# Patient Record
Sex: FEMALE | Race: WHITE
[De-identification: names, ages, dates, MRNs, and addresses within clinical notes are randomized per-mention and may not be internally consistent; named-entity substitution may affect disease eponyms.]

---

## 2020-08-06 ENCOUNTER — HOSPITAL ENCOUNTER (EMERGENCY)
Dept: HOSPITAL 26 - MED | Age: 30
LOS: 1 days | Discharge: HOME | End: 2020-08-07
Payer: COMMERCIAL

## 2020-08-06 VITALS — BODY MASS INDEX: 22.5 KG/M2 | WEIGHT: 140 LBS | HEIGHT: 66 IN

## 2020-08-06 VITALS — SYSTOLIC BLOOD PRESSURE: 109 MMHG | DIASTOLIC BLOOD PRESSURE: 67 MMHG

## 2020-08-06 DIAGNOSIS — Z88.0: ICD-10-CM

## 2020-08-06 DIAGNOSIS — I10: ICD-10-CM

## 2020-08-06 DIAGNOSIS — R41.82: Primary | ICD-10-CM

## 2020-08-06 DIAGNOSIS — F31.89: ICD-10-CM

## 2020-08-06 DIAGNOSIS — F17.210: ICD-10-CM

## 2020-08-06 LAB
ALBUMIN FLD-MCNC: 3.9 G/DL (ref 3.4–5)
ANION GAP SERPL CALCULATED.3IONS-SCNC: 10.7 MMOL/L (ref 8–16)
APAP SERPL-MCNC: < 0.5 UG/ML (ref 10–30)
APPEARANCE UR: CLEAR
AST SERPL-CCNC: 16 U/L (ref 15–37)
BARBITURATES UR QL SCN: NEGATIVE NG/ML
BASOPHILS # BLD AUTO: 0 K/UL (ref 0–0.22)
BASOPHILS NFR BLD AUTO: 0.6 % (ref 0–2)
BENZODIAZ UR QL SCN: NEGATIVE NG/ML
BILIRUB SERPL-MCNC: 0.4 MG/DL (ref 0–1)
BILIRUB UR QL STRIP: (no result)
BUN SERPL-MCNC: 16 MG/DL (ref 7–18)
BZE UR QL SCN: NEGATIVE NG/ML
CANNABINOIDS UR QL SCN: POSITIVE NG/ML
CHLORIDE SERPL-SCNC: 100 MMOL/L (ref 98–107)
CO2 SERPL-SCNC: 30.9 MMOL/L (ref 21–32)
COLOR UR: YELLOW
CREAT SERPL-MCNC: 0.9 MG/DL (ref 0.6–1.3)
EOSINOPHIL # BLD AUTO: 0.2 K/UL (ref 0–0.4)
EOSINOPHIL NFR BLD AUTO: 1.9 % (ref 0–4)
ERYTHROCYTE [DISTWIDTH] IN BLOOD BY AUTOMATED COUNT: 15.2 % (ref 11.6–13.7)
GFR SERPL CREATININE-BSD FRML MDRD: 93 ML/MIN (ref 90–?)
GLUCOSE SERPL-MCNC: 77 MG/DL (ref 74–106)
GLUCOSE UR STRIP-MCNC: NEGATIVE MG/DL
HCT VFR BLD AUTO: 38.4 % (ref 36–48)
HGB BLD-MCNC: 12.7 G/DL (ref 12–16)
HGB UR QL STRIP: (no result)
LEUKOCYTE ESTERASE UR QL STRIP: NEGATIVE
LYMPHOCYTES # BLD AUTO: 2.2 K/UL (ref 2.5–16.5)
LYMPHOCYTES NFR BLD AUTO: 26.5 % (ref 20.5–51.1)
MCH RBC QN AUTO: 29 PG (ref 27–31)
MCHC RBC AUTO-ENTMCNC: 33 G/DL (ref 33–37)
MCV RBC AUTO: 87.7 FL (ref 80–94)
MONOCYTES # BLD AUTO: 0.9 K/UL (ref 0.8–1)
MONOCYTES NFR BLD AUTO: 11.1 % (ref 1.7–9.3)
NEUTROPHILS # BLD AUTO: 4.9 K/UL (ref 1.8–7.7)
NEUTROPHILS NFR BLD AUTO: 59.9 % (ref 42.2–75.2)
NITRITE UR QL STRIP: NEGATIVE
OPIATES UR QL SCN: NEGATIVE NG/ML
PCP UR QL SCN: NEGATIVE NG/ML
PH UR STRIP: 6 [PH] (ref 5–9)
PLATELET # BLD AUTO: 270 K/UL (ref 140–450)
POTASSIUM SERPL-SCNC: 2.6 MMOL/L (ref 3.5–5.1)
RBC # BLD AUTO: 4.38 MIL/UL (ref 4.2–5.4)
RBC #/AREA URNS HPF: (no result) /HPF (ref 0–5)
SALICYLATES SERPL-MCNC: < 2.8 MG/DL (ref 2.8–20)
SODIUM SERPL-SCNC: 139 MMOL/L (ref 136–145)
WBC # BLD AUTO: 8.1 K/UL (ref 4.8–10.8)
WBC,URINE: (no result) /HPF (ref 0–5)

## 2020-08-06 PROCEDURE — 80305 DRUG TEST PRSMV DIR OPT OBS: CPT

## 2020-08-06 PROCEDURE — G0480 DRUG TEST DEF 1-7 CLASSES: HCPCS

## 2020-08-06 PROCEDURE — 81001 URINALYSIS AUTO W/SCOPE: CPT

## 2020-08-06 PROCEDURE — 80053 COMPREHEN METABOLIC PANEL: CPT

## 2020-08-06 PROCEDURE — 93005 ELECTROCARDIOGRAM TRACING: CPT

## 2020-08-06 PROCEDURE — 85025 COMPLETE CBC W/AUTO DIFF WBC: CPT

## 2020-08-06 PROCEDURE — 84484 ASSAY OF TROPONIN QUANT: CPT

## 2020-08-06 PROCEDURE — 99284 EMERGENCY DEPT VISIT MOD MDM: CPT

## 2020-08-06 PROCEDURE — 80178 ASSAY OF LITHIUM: CPT

## 2020-08-06 PROCEDURE — 81025 URINE PREGNANCY TEST: CPT

## 2020-08-06 PROCEDURE — G0482 DRUG TEST DEF 15-21 CLASSES: HCPCS

## 2020-08-06 PROCEDURE — 36415 COLL VENOUS BLD VENIPUNCTURE: CPT

## 2020-08-06 RX ADMIN — SODIUM CHLORIDE ONE MLS/HR: 9 INJECTION, SOLUTION INTRAVENOUS at 16:47

## 2020-08-06 RX ADMIN — POTASSIUM CHLORIDE ONE MEQ: 750 TABLET, FILM COATED, EXTENDED RELEASE ORAL at 18:19

## 2020-08-06 NOTE — NUR
SPOKE WITH JESS (PHARMACIST) FROM [facility]ISON [facility] @ [phone] AND GAVE 
UPDATE REGARDING RECENT LABS. JESS ADVISED TO OBTAIN LITHIUM LEVEL, SO WILL 
INFORM RON KENNEY.

## 2020-08-06 NOTE — NUR
CALLED POISON CONTROL AT [phone] SPOKE TO CARLITA.  PER CARLITA THERE ARE NO 
CLEAR OBSERVATION HOURS FOR PT.  SHE RECOMMENDS A LITHIUM, AND CHEM DRAW EVERY 
4 HRS, UNTIL CLEAR PEAK AND DOWNTREND X 2. ALSO  NS @ 150-200 ML/HR, SHE STATES 
THIS HELPS TO EXCRETE LITHIUM FROM THE KIDNEYS.  RON MADE AWARE OF POISON 
CONTROL'S RECOMMENDATIONS.

## 2020-08-06 NOTE — NUR
PT BIBA FROM HOME DUE TO OVERDOSE ON MEDICATION PER EMS. PER EMS, FAMILY CALLED 
911 DUE TO PATIENT NOT WAKING UP AFTER TAKING MEDICATION. PT PRESENTS A/OX4, 
EUPNIC,BP WNL, TACHY 113, 99% RA, COOPERATIVE, CALM. NO DISTRESS NOTED. NO 
PAIN. PER PT ONLY TOOK TWO PILLS, ONE LITHIUM FOR BIPOLAR AND ONE ZYPREXA FOR 
PTSD. DENIES DTO/DTS/GD. ALLERGIES PNC. HX BIPOLAR,HTN,PTSD. RX 
ZYPREXA,LITHIUM. NO NVD. SIDE RAIL X1.

## 2020-08-06 NOTE — NUR
PT'S SON ORION CALLED AND STATED PT SHOWED UP TO HIS HOUSE YESTERDAY AND STATED 
SHE TRIED TO HANG HERSELF.  UPON ASSESSING PT'S NECK, THERE IS NO BRUISING OR 
BURNS AROUND THE NECK.  PT DENIES TRYING TO HARM SELF WHEN WOKEN UP. BUSTER 
STATES PT HAS PMH OF SCHIZOPHRENIA, AND BIPOLAR DISORDER, AND HAS A HISTORY OF 
LEAVING AMA.  HE STATES HE IS AFRAID FOR HIS MOTHER, AND SHE HAS A HISTORY OF 
BECOMING COMBATIVE. HE WOULD LIKE TO BE CALLED WHEN MOTHER IS DISCHARGED OR IF 
SHE IS RETAINED, AND STATED HE COULD BE REACHED IF MORE MEDICAL HISTORY IS 
NEEDED FOR PT. 



[facility] #[phone]



INFORMATION PAST ON TO BLAYNE VALENTINE; AND JESSICA BURNETT

## 2020-08-06 NOTE — NUR
PT SLEEPING IN BED. NOT AROUSABLE TO NAME, BUT BY TOUCH.  VSS, R/R EQUAL AND 
UNLABORED. SIDE RAIL X2, BED IN LOW POSITION, WILL CONTINUE TO MONITOR.

## 2020-08-06 NOTE — NUR
PT ASLEEP IN BED IN POSITION OF COMFORT, BED LOW AND LOCKED, SIDERAILS UP, VSS, 
WILL CONTINUE TO MONITOR.

## 2020-08-06 NOTE — NUR
SPOKE TO ILIR (PHARMACIST) FROM [facility]ISON [facility] WHO PHONED BACK FOR 
UPDATE. INFORMED PHARMACIST WE SENF OUT OUR LITHIUM LAB DRAW AND TO PHONE BACK 
LATER TO SEE IF WE HAVE THE LAB RESULTS YET.

## 2020-08-06 NOTE — NUR
TELEPSYCH DOCTOR ATTEMPTED TO INTERVIEW PT AGAIN, PT WASN'T AROUSABLE TO NAME.  
PT WOULD WAKE UP TO TOUCH, AND FALL RIGHT BACK ASLEEP.  TELEPSYCH DOCTOR STATED 
TO CONTACT HIM ONCE PT IS  A&O, THEN HE WILL COMPLETE ASSESSMENT.

## 2020-08-07 VITALS — SYSTOLIC BLOOD PRESSURE: 101 MMHG | DIASTOLIC BLOOD PRESSURE: 64 MMHG

## 2020-08-07 NOTE — NUR
CALLED PTS RUSLAN ZAZUETA [phone] IN ATTEMPT TO FIND A RIDE HOME FOR DISCHARGED 
PT. MOM REFUSED TO COME  PT. SHE SAID "SHE IS CAUSING TROUBLE AT HER 
HOUSE AND IT WOULD BE OK IF SHE WAS DISCHARGED WITHOUT A RIDE."

## 2020-08-07 NOTE — NUR
CALLED PTS SISTER PATSY [phone] IN AN ATTEMPT TO GET A RIDE FOR PT HOME. 
SISTER AGREED TO COME  PT IN 15 MIN.

## 2021-05-14 ENCOUNTER — HOSPITAL ENCOUNTER (EMERGENCY)
Dept: HOSPITAL 26 - MED | Age: 31
Discharge: HOME | End: 2021-05-14
Payer: COMMERCIAL

## 2021-05-14 VITALS — SYSTOLIC BLOOD PRESSURE: 110 MMHG | DIASTOLIC BLOOD PRESSURE: 58 MMHG

## 2021-05-14 VITALS — HEIGHT: 61 IN | BODY MASS INDEX: 37.38 KG/M2 | WEIGHT: 198 LBS

## 2021-05-14 VITALS — DIASTOLIC BLOOD PRESSURE: 66 MMHG | SYSTOLIC BLOOD PRESSURE: 115 MMHG

## 2021-05-14 DIAGNOSIS — R10.30: ICD-10-CM

## 2021-05-14 DIAGNOSIS — O26.891: ICD-10-CM

## 2021-05-14 DIAGNOSIS — Z88.0: ICD-10-CM

## 2021-05-14 DIAGNOSIS — Z3A.09: ICD-10-CM

## 2021-05-14 DIAGNOSIS — O23.41: Primary | ICD-10-CM

## 2021-05-14 LAB
ALBUMIN FLD-MCNC: 2.9 G/DL (ref 3.4–5)
ANION GAP SERPL CALCULATED.3IONS-SCNC: 10.3 MMOL/L (ref 8–16)
APPEARANCE UR: (no result)
AST SERPL-CCNC: 13 U/L (ref 15–37)
BASOPHILS # BLD AUTO: 0 K/UL (ref 0–0.22)
BASOPHILS NFR BLD AUTO: 0.3 % (ref 0–2)
BILIRUB SERPL-MCNC: 0.1 MG/DL (ref 0–1)
BILIRUB UR QL STRIP: NEGATIVE
BUN SERPL-MCNC: 11 MG/DL (ref 7–18)
CHLORIDE SERPL-SCNC: 104 MMOL/L (ref 98–107)
CO2 SERPL-SCNC: 27.6 MMOL/L (ref 21–32)
COLOR UR: YELLOW
CREAT SERPL-MCNC: 0.5 MG/DL (ref 0.6–1.3)
EOSINOPHIL # BLD AUTO: 0.2 K/UL (ref 0–0.4)
EOSINOPHIL NFR BLD AUTO: 2.8 % (ref 0–4)
ERYTHROCYTE [DISTWIDTH] IN BLOOD BY AUTOMATED COUNT: 15.1 % (ref 11.6–13.7)
GFR SERPL CREATININE-BSD FRML MDRD: 183 ML/MIN (ref 90–?)
GLUCOSE SERPL-MCNC: 98 MG/DL (ref 74–106)
GLUCOSE UR STRIP-MCNC: NEGATIVE MG/DL
HCT VFR BLD AUTO: 37.5 % (ref 36–48)
HGB BLD-MCNC: 12.5 G/DL (ref 12–16)
HGB UR QL STRIP: (no result)
LEUKOCYTE ESTERASE UR QL STRIP: NEGATIVE
LYMPHOCYTES # BLD AUTO: 1.5 K/UL (ref 2.5–16.5)
LYMPHOCYTES NFR BLD AUTO: 25 % (ref 20.5–51.1)
MCH RBC QN AUTO: 29 PG (ref 27–31)
MCHC RBC AUTO-ENTMCNC: 33 G/DL (ref 33–37)
MCV RBC AUTO: 87.9 FL (ref 80–94)
MONOCYTES # BLD AUTO: 0.6 K/UL (ref 0.8–1)
MONOCYTES NFR BLD AUTO: 9.3 % (ref 1.7–9.3)
NEUTROPHILS # BLD AUTO: 3.9 K/UL (ref 1.8–7.7)
NEUTROPHILS NFR BLD AUTO: 62.6 % (ref 42.2–75.2)
NITRITE UR QL STRIP: NEGATIVE
PH UR STRIP: 7 [PH] (ref 5–9)
PLATELET # BLD AUTO: 248 K/UL (ref 140–450)
POTASSIUM SERPL-SCNC: 3.9 MMOL/L (ref 3.5–5.1)
RBC # BLD AUTO: 4.27 MIL/UL (ref 4.2–5.4)
RBC #/AREA URNS HPF: (no result) /HPF (ref 0–5)
SODIUM SERPL-SCNC: 138 MMOL/L (ref 136–145)
WBC # BLD AUTO: 6.2 K/UL (ref 4.8–10.8)
WBC,URINE: (no result) /HPF (ref 0–5)

## 2021-05-14 NOTE — NUR
PATIENT PRESENTS TO ED WITH LOWER ABD PAIN STARTED 2 WEEKS AGO . PT STATES SHE 
HAD POSITIVE PREGNANCY TEST AND STATED HAD STOP TAKING HER MEDICATIONS SINCE 
MOTHER'S DAY . DENIES N/V/D; SKIN IS PINK/WARM/DRY; AAOX4 WITH EVEN AND STEADY 
GAIT; LUNGS CLEAR BL; HR EVEN AND REGULAR; PT DENIES ANY FEVER, CP, SOB, OR 
COUGH AT THIS TIME; PATIENT STATES PAIN OF 5/10 TO THE LOWER ABD. AT THIS TIME; 
VSS; PATIENT POSITIONED FOR COMFORT; HOB ELEVATED; BEDRAILS UP X2; BED DOWN. ER 
MD MADE AWARE OF PT STATUS.

## 2021-05-14 NOTE — NUR
Patient discharged with v/s stable. Written and verbal after care instructions 
given and explained. 

Patient alert, oriented and verbalized understanding of instructions. 
Ambulatory with steady gait. All questions addressed prior to discharge. ID 
band removed. Patient advised to follow up with PMD. Rx of KEFFLEX AND PRENATAL 
given. Patient educated on indication of medication including possible reaction 
and side effects. Opportunity to ask questions provided and answered.

## 2021-11-29 NOTE — NUR
Patient discharged with v/s stable. Written and verbal after care instructions 
given and explained. 

Patient alert, oriented and verbalized understanding of instructions. 
Ambulatory with steady gait. All questions addressed prior to discharge. ID 
band removed. Patient advised to follow up with PMD. Rx of KEFLEX given. 
Patient educated on indication of medication including possible reaction and 
side effects. Opportunity to ask questions provided and answered. Epidermal Closure Graft Donor Site (Optional): simple interrupted

## 2021-11-29 NOTE — NUR
30 YO/F BIB SELF W C/O  INCISION OPENNING W SOME BLEEDING X TODAY. PT 
REPORTS HAVING THE  ON 21. PT DENIES ANY PUSULENT DISCHARGE, 
FOUL SMELL, FEVERS, PAIN, N/V/D. PT REPORTS A SLIGHT STINGING SENSATION TO 
AREA. C-SEC INCISION IS SLIGHTLY OPEN, MINIMAL BLOOD NOTED BUT CONTROLLED, NO 
SIGNS OF INFECTION NOTED. PT LAYING SUPINE IN BED, IN GOWN. VSS. ERMD AT 
BEDSIDE. 



PMH:C-SEC 21

NAHOMI